# Patient Record
Sex: FEMALE | Race: WHITE | Employment: OTHER | URBAN - METROPOLITAN AREA
[De-identification: names, ages, dates, MRNs, and addresses within clinical notes are randomized per-mention and may not be internally consistent; named-entity substitution may affect disease eponyms.]

---

## 2017-04-27 ENCOUNTER — HOSPITAL ENCOUNTER (EMERGENCY)
Age: 72
Discharge: HOME | End: 2017-04-27
Attending: STUDENT IN AN ORGANIZED HEALTH CARE EDUCATION/TRAINING PROGRAM
Payer: MEDICARE

## 2017-04-27 ENCOUNTER — APPOINTMENT (OUTPATIENT)
Dept: GENERAL RADIOLOGY | Age: 72
End: 2017-04-27
Attending: EMERGENCY MEDICINE
Payer: MEDICARE

## 2017-04-27 VITALS
BODY MASS INDEX: 40.25 KG/M2 | SYSTOLIC BLOOD PRESSURE: 137 MMHG | HEIGHT: 60 IN | WEIGHT: 205 LBS | RESPIRATION RATE: 16 BRPM | TEMPERATURE: 98.4 F | HEART RATE: 68 BPM | OXYGEN SATURATION: 97 % | DIASTOLIC BLOOD PRESSURE: 68 MMHG
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
## 2017-04-27 DIAGNOSIS — M25.561 PAIN IN BOTH KNEES, UNSPECIFIED CHRONICITY: ICD-10-CM

## 2017-04-27 DIAGNOSIS — M25.562 PAIN IN BOTH KNEES, UNSPECIFIED CHRONICITY: ICD-10-CM

## 2017-04-27 DIAGNOSIS — S93.601A RIGHT FOOT SPRAIN, INITIAL ENCOUNTER: Primary | ICD-10-CM

## 2017-04-27 PROCEDURE — 97161 PT EVAL LOW COMPLEX 20 MIN: CPT

## 2017-04-27 PROCEDURE — 74011250637 HC RX REV CODE- 250/637: Performed by: STUDENT IN AN ORGANIZED HEALTH CARE EDUCATION/TRAINING PROGRAM

## 2017-04-27 PROCEDURE — 97116 GAIT TRAINING THERAPY: CPT

## 2017-04-27 PROCEDURE — 74011250636 HC RX REV CODE- 250/636: Performed by: STUDENT IN AN ORGANIZED HEALTH CARE EDUCATION/TRAINING PROGRAM

## 2017-04-27 PROCEDURE — 73620 X-RAY EXAM OF FOOT: CPT

## 2017-04-27 PROCEDURE — 97530 THERAPEUTIC ACTIVITIES: CPT

## 2017-04-27 PROCEDURE — G8979 MOBILITY GOAL STATUS: HCPCS

## 2017-04-27 PROCEDURE — 77030036687 HC SHOE PSTOP S2SG -A

## 2017-04-27 PROCEDURE — G8980 MOBILITY D/C STATUS: HCPCS

## 2017-04-27 PROCEDURE — G8978 MOBILITY CURRENT STATUS: HCPCS

## 2017-04-27 PROCEDURE — 73562 X-RAY EXAM OF KNEE 3: CPT

## 2017-04-27 PROCEDURE — 96372 THER/PROPH/DIAG INJ SC/IM: CPT

## 2017-04-27 PROCEDURE — 99283 EMERGENCY DEPT VISIT LOW MDM: CPT

## 2017-04-27 RX ORDER — HYDROMORPHONE HYDROCHLORIDE 1 MG/ML
1 INJECTION, SOLUTION INTRAMUSCULAR; INTRAVENOUS; SUBCUTANEOUS
Status: COMPLETED | OUTPATIENT
Start: 2017-04-27 | End: 2017-04-27

## 2017-04-27 RX ORDER — HYDROCODONE BITARTRATE AND ACETAMINOPHEN 5; 325 MG/1; MG/1
1 TABLET ORAL
Qty: 10 TAB | Refills: 0 | Status: SHIPPED | OUTPATIENT
Start: 2017-04-27

## 2017-04-27 RX ORDER — HYDROCODONE BITARTRATE AND ACETAMINOPHEN 5; 325 MG/1; MG/1
1 TABLET ORAL ONCE
Status: COMPLETED | OUTPATIENT
Start: 2017-04-27 | End: 2017-04-27

## 2017-04-27 RX ADMIN — HYDROCODONE BITARTRATE AND ACETAMINOPHEN 1 TABLET: 5; 325 TABLET ORAL at 15:02

## 2017-04-27 RX ADMIN — HYDROMORPHONE HYDROCHLORIDE 1 MG: 1 INJECTION, SOLUTION INTRAMUSCULAR; INTRAVENOUS; SUBCUTANEOUS at 16:14

## 2017-04-27 NOTE — SENIOR SERVICES NOTE
physical Therapy Emergency Department EVALUATION/DISCHARGE with CMS G codes  Patient: Shabana Foley (63 y.o. female)  Date: 4/27/2017  Primary Diagnosis: B knees, R foot pain s/p fall        Precautions:   Fall, WBAT  ASSESSMENT :  Based on the objective data described below, the patient presents with c/o B knee (L>R) pain and R foot pain resulting in significant decline in function. Patient's spouse is at chair side for support. Patient has been d/c but RN concerned for patient's mobility and consult SSED PT. Patient reporting fell yesterday at home; pt was wearing wedge sandal and believes she twisted her ankle while walking down the indoor ramp. Patient fell to her knees and needed her  to assist.  Patient seated in transport chair in [facility] waiting room. Upon evaluation, patient with edema and slight bluish tint to R lateral foot; pt tender to light palpation and demonstrate difficulty with active movement of toes. Patient report most tenderness from mid lateral foot distal to the toes. Patient anxious for assessment due to discomfort; pt reporting pain medication provided has not provided any relief. Taryn March RN made aware who will speak with physician. Patient wearing surgical shoe for support. Patient stand with CGA and cuing for encouragement. Patient demonstrate difficulty with gait training using crutches; PT recommend RW overground for stability and additional support. See below for details. Patient negotiate 3 stairs using 1HR and crutch with Marti; pt requiring consistent cuing for step sequencing and encouragement. Patient with R foot pain and L knee pain so stairs were difficult for patient to tolerate. Patient's spouse attempt to help with support on stairs but patient reports feeling more comfortable using crutch instead. Round with Dr. Marcos Gilbert regarding evaluation; recommend CT to r/o 5th met head fx. Consult with PRINCESS Bui for support wrap to wear with shoe.   This writer also educate patient on importance of RestIceCompressionElevation method during recovery; pt and spouse report understanding and are appreciative of assistance. Patient will further benefit from outpatient physical therapy post ortho f/u. PLAN :  Discharge Recommendations:   [x]   Home with family  []   Skilled nursing facility  []   Admission to hospital with rehab likely needed  []   Inpatient rehab referral  []   Outpatient physical therapy referral  []   Other:    Further Equipment Recommendations for Discharge:   [x]   Rolling walker with 5\" wheels - order placed and delivery in ED  []   Crutches   []   Zoila Pinks   []   Wheelchair   []   Other:     COMMUNICATION/EDUCATION:   Communication/Collaboration:  [x]   Fall prevention education was provided and the patient/caregiver indicated understanding. [x]   Patient/family have participated as able and agree with findings and recommendations. []   Patient is unable to participate in plan of care at this time. Findings and recommendations were discussed with: ED physician, SSED Case Management, PCT/EMTand Registered Nurse       SUBJECTIVE:   Patient stated I can't put any weight on the outside of my foot. That hydrocodone didn't even touch the pain.     OBJECTIVE DATA SUMMARY:     Past Medical History:   Diagnosis Date    Asthma     CAD (coronary artery disease)     MI     Cancer ([facility] [address]     Adenocarcinoma, right lobe    Cancer ([facility] [address]     Carcinoid, stage 1    Hypertension     Lung cancer ([facility] [address]      Past Surgical History:   Procedure Laterality Date    BREAST SURGERY PROCEDURE UNLISTED      implants    CHEST SURGERY PROCEDURE UNLISTED      1/04/2013    HX APPENDECTOMY      HX GYN      Hysterectomy, 1987    HX HEENT      HX LOBECTOMY         Prior Level of Function/Home Situation: baseline independent without devices; lives with spouse  Home Situation  Home Environment: Private residence  # Steps to Enter: 4  Rails to Enter: Yes  [employer] : Bilateral (only reach one at a time)  One/Two Story Residence: One story  Living Alone: No  Support Systems: Spouse/Significant Other/Partner, Family member(s), Friends \ neighbors  Patient Expects to be Discharged to[de-identified] Private residence  Current DME Used/Available at Home: None  Tub or Shower Type: Tub/Shower combination     Critical Behavior:  Neurologic State: Alert, Appropriate for age  Orientation Level: Oriented X4  Cognition: Appropriate decision making, Appropriate for age attention/concentration, Follows commands  Safety/Judgement: Awareness of environment, Fall prevention, Home safety       Skin:  Edema and slight bluish tint to R lateral foot; pt tender to light palpation and demonstrate difficulty with toe mobility    Strength:    Strength: Generally decreased, functional        Tone & Sensation:   Tone: Normal  Sensation: Intact        Range Of Motion:  AROM: Generally decreased, functional - Knee and ankle/foot due to discomfort; swelling noted to R lateral foot  PROM: Within functional limits           Coordination:  Coordination: Within functional limits    Functional Mobility:  Transfers:  Sit to Stand: Contact guard assistance  Stand to Sit: Contact guard assistance     Balance:   Sitting: Intact  Standing: Impaired; With support  Standing - Static: Fair  Standing - Dynamic : Fair      Ambulation/Gait Training:  Patient initially gait train with crutches; pt amb 5ft using crutches and gait belt, pt demonstrate difficulty with maintaining balance and advancement of LEs due to discomfort. Patient needs RW for increased stability and support.    Distance (ft): 55 Feet (ft)  Assistive Device: Walker, rolling;Gait belt  Ambulation - Level of Assistance: Supervision  Gait Abnormalities: Antalgic;Decreased step clearance  Right Side Weight Bearing: As tolerated  Base of Support: Widened  Stance: Right decreased  Speed/Joann: Slow  Step Length: Right shortened;Left shortened     Stair Training:  Number of Stairs: 3  Rail Use: Leftt (+ crutch on R)  Level of Assistance: Minimum Assistance + Additional time + Max cuing for encouragement and step sequencing   Patient demonstrate difficulty with stairs due to B knee pain (L >R)    Functional Measure:  Tinetti test:    Sitting Balance: 1  Arises: 1  Attempts to Rise: 2  Immediate Standing Balance: 1  Standing Balance: 1  Nudged: 1  Eyes Closed: 1  Turn 360 Degrees - Continuous/Discontinuous: 0  Turn 360 Degrees - Steady/Unsteady: 1  Sitting Down: 1  Balance Score: 10  Indication of Gait: 0  R Step Length/Height: 1  L Step Length/Height: 0  R Foot Clearance: 1  L Foot Clearance: 1  Step Symmetry: 0  Step Continuity: 0  Path: 1  Trunk: 0  Walking Time: 0  Gait Score: 4  Total Score: 14       Tinetti Test and G-code impairment scale:  Percentage of Impairment CH    0%   CI    1-19% CJ    20-39% CK    40-59% CL    60-79% CM    80-99% CN     100%   Tinetti  Score 0-28 28 23-27 17-22 12-16 6-11 1-5 0       Tinetti Tool Score Risk of Falls  <19 = High Fall Risk  19-24 = Moderate Fall Risk  25-28 = Low Fall Risk  Tinetti ME. Performance-Oriented Assessment of Mobility Problems in Elderly Patients. Stewart 66; [id number]. (Scoring Description: PT Bulletin Feb. 10, 1993)    Older adults: Rosmery Art et al, 2009; n = 1000 [facility] elderly evaluated with ABC, CAROLE, ADL, and IADL)  · Mean CAROLE score for males aged 69-68 years = 26.21(3.40)  · Mean CAROLE score for females age 69-68 years = 25.16(4.30)  · Mean CAROLE score for males over 80 years = 23.29(6.02)  · Mean CAROLE score for females over 80 years = 17.20(8.32)       In compliance with CMSs Claims Based Outcome Reporting, the following G-code set was chosen for this patient based on their primary functional limitation being treated: The outcome measure chosen to determine the severity of the functional limitation was the Tinetti with a score of 14/28 which was correlated with the impairment scale.     ? Mobility - Walking and Moving Around:  - CURRENT STATUS: CK - 40%-59% impaired, limited or restricted    - GOAL STATUS: CK - 40%-59% impaired, limited or restricted    - D/C STATUS:  CK - 40%-59% impaired, limited or restricted     Pain:  Pain Scale 1: Numeric (0 - 10)  Pain Intensity 1: 7  Pain Location 1: Foot;Knee  Pain Orientation 1: Right        Activity Tolerance:   Good    Please refer to the flowsheet for vital signs taken during this treatment.     After treatment:   [x]         Patient left in no apparent distress sitting up in chair  []         Patient left in no apparent distress in bed  [x]         Call bell left within reach  [x]         Physician notified  [x]         Caregiver present  []         Bed alarm activated        Thank you for this referral.  Mia Longoria, PT, DPT   Time Calculation: 50 mins

## 2017-04-27 NOTE — DISCHARGE INSTRUCTIONS
Foot Sprain: Care Instructions  Your Care Instructions    A foot sprain occurs when you stretch or tear the ligaments around your foot. Ligaments are the tough tissues that connect one bone to another. A sprain can happen when you run, fall, or hit your toe against something. Sprains often happen when you jump or change direction quickly. This may occur when you play basketball, soccer, or other sports. Most foot sprains will get better with treatment at home. Follow-up care is a key part of your treatment and safety. Be sure to make and go to all appointments, and call your doctor if you are having problems. It's also a good idea to know your test results and keep a list of the medicines you take. How can you care for yourself at home? · Walk or put weight on your sprained foot as long as it does not hurt. · If your doctor gave you a splint or immobilizer, wear it as directed. If you were given crutches, use them as directed. · For the first 2 days after your injury, avoid hot showers, hot tubs, or hot packs. They may increase swelling. · Put ice or a cold pack on your foot for 10 to 20 minutes at a time to stop swelling. Try this every 1 to 2 hours for 3 days (when you are awake) or until the swelling goes down. Put a thin cloth between the ice pack and your skin. Keep your splint dry. · After 2 or 3 days, if your swelling is gone, put a heating pad (set on low) or a warm cloth on your foot. Some doctors suggest that you go back and forth between hot and cold treatments. · Prop up your foot on a pillow when you ice it or anytime you sit or lie down. Try to keep it above the level of your heart. This will help reduce swelling. · Take pain medicines exactly as directed. ¨ If the doctor gave you a prescription medicine for pain, take it as prescribed. ¨ If you are not taking a prescription pain medicine, ask your doctor if you can take an over-the-counter medicine.   · Do any exercises that your doctor or physical therapist suggests. · Return to your usual exercise gradually as you feel better. When should you call for help? Call your doctor now or seek immediate medical care if:  · You have increased or severe pain. · Your toes are cool or pale or change color. · Your wrap or splint feels too tight. · You have signs of a blood clot, such as:  ¨ Pain in your calf, back of the knee, thigh, or groin. ¨ Redness and swelling in your leg or groin. · You have tingling, weakness, or numbness in your leg or foot. Watch closely for changes in your health, and be sure to contact your doctor if:  · You cannot put any weight on your foot. · You get a fever. · You do not get better as expected. Where can you learn more? Go to http://diana-merry.info/. Enter R166 in the search box to learn more about \"Foot Sprain: Care Instructions. \"  Current as of: June 21, 2016  Content Version: 11.2  © 7443-5716 [employer], [employer]. Care instructions adapted under license by [employer] (which disclaims liability or warranty for this information). If you have questions about a medical condition or this instruction, always ask your healthcare professional. [address] any warranty or liability for your use of this information.

## 2017-04-27 NOTE — ED NOTES
Discharge instructions given to patient by MD and nurse. Pt has been given counseling on medication use and verbalizes understanding. Pt awaiting xray CDs before discharge and inquired about crutches walking at home. MD notified and orders placed but because of pt age, unsure of able to utilize crutches safely, will place consult for senior services for home ambulation    4:18 PM  Jessica at bedside ambulating pt with crutches and are trying to order walker for home. More pain medication given and MD notified by PT to inquire about CT of foot which he is not ordering at this time. Pt got CDs of xray in hand and awaiting walker before discharged home. 4:48 PM  Pt and family have crutches and walker, discharge paperwork in hand and xray CDS. Pt wheelchaired off of unit in no signs of distress and denies any questions.

## 2017-04-27 NOTE — ED PROVIDER NOTES
HPI Comments: 67 y.o. female with past medical history significant for osteoarthritis and past left arthroscopic surgery (1995) who presents from home with chief complaint of left knee pain and right foot pain after mechanical GLF. Pt reports that last night she tripped off a single step inverting her right ankle and falling forward on to her knees. Since the incident she has had constant aching right mid-foot pain with swelling and left knee pain. She reports that her pain is worse with movement and ambulation. She has tried Tyelnol arthritis with minimal relief. Pt denies LOC, head injury, dizziness, any other injury. There are no other acute medical concerns at this time. PCP: Annetta Finch MD  Note written by rafi Chairez, as dictated by Enrique Tristan MD 2:56 PM           The history is provided by the patient. Past Medical History:   Diagnosis Date    Asthma     CAD (coronary artery disease)     MI     [facility] ([facility] [address]     Adenocarcinoma, right lobe    Cancer ([facility] [address]     Carcinoid, stage 1    Hypertension     Lung cancer ([facility] [address]        Past Surgical History:   Procedure Laterality Date    BREAST SURGERY PROCEDURE UNLISTED      implants    CHEST SURGERY PROCEDURE UNLISTED      1/04/2013    HX APPENDECTOMY      HX GYN      Hysterectomy, 1987    HX HEENT      HX LOBECTOMY           History reviewed. No pertinent family history. Social History     Social History    Marital status:      Spouse name: N/A    Number of children: N/A    Years of education: N/A     Occupational History    Not on file. Social History Main Topics    Smoking status: Former Smoker    Smokeless tobacco: Not on file    Alcohol use Yes    Drug use: No    Sexual activity: Not on file     Other Topics Concern    Not on file     Social History Narrative         ALLERGIES: Meloxicam; Lisinopril; Plavix [clopidogrel];  Cymbalta [duloxetine]; and Erythromycin    Review of Systems Constitutional: Negative for chills and fever. HENT: Negative for congestion, rhinorrhea and sore throat. Respiratory: Negative for cough and shortness of breath. Gastrointestinal: Negative for abdominal pain, diarrhea, nausea and vomiting. Musculoskeletal: Positive for arthralgias, joint swelling and myalgias. Negative for back pain and neck pain. All other systems reviewed and are negative. Vitals:    04/27/17 1404   BP: 148/73   Pulse: 74   Resp: 16   Temp: 98.4 °F (36.9 °C)   SpO2: 96%   Weight: 93 kg (205 lb)   Height: 5' (1.524 m)            Physical Exam   Constitutional: She is oriented to person, place, and time. She appears well-developed and well-nourished. Obese female   HENT:   Head: Normocephalic and atraumatic. Eyes: Conjunctivae and EOM are normal. Pupils are equal, round, and reactive to light. Neck: Normal range of motion. Neck supple. Cardiovascular: Normal rate, regular rhythm and normal heart sounds. No murmur heard. Pulmonary/Chest: Effort normal and breath sounds normal. No respiratory distress. Abdominal: Soft. Bowel sounds are normal. She exhibits no distension. There is no tenderness. There is no rebound. Musculoskeletal: Normal range of motion. She exhibits tenderness. She exhibits no edema. Mild tenderness with minimal soft tissue swelling and ecchymosis over dorsal right mid-foot. Diffuse non-focal tenderness to left knee. Neurological: She is alert and oriented to person, place, and time. No cranial nerve deficit. She exhibits normal muscle tone. Coordination normal.   Skin: Skin is warm and dry. No rash noted. Psychiatric: She has a normal mood and affect. Her behavior is normal.   Nursing note and vitals reviewed.    Note written by rafi Barron, as dictated by Charles Parker MD 3:07 PM      [facility]  ED Course       Procedures

## 2017-04-27 NOTE — PROGRESS NOTES
SSED/CM consult received and appreciated. EMR reviewed. History significant for respiratory failure and osteoarthritis. Patient presents to the ED s/p fall. Patient will need RW. Met w/patient and spouse introduced to role of CM. This writer acknowledged patient in fast track area offered private location to talk - Ms. Martinez verbalized would remain in area for assessment. Patient lives home w/ spouse and is independent w/ ADLs.  works as a realtor w/ variable hours. DME list available and informed of DME closet at [facility]. Patient prefer [facility] if approved will have RW when leaving hospital. [facility] letter completed and agency paperwork. Referral sent via [employer]. Contact made w/agency - authorization obtained to provide RW from DME closet. Updates provided to patient and Nursing. No additional needs verbalized at this time. Transportation home provided by spouse. Care Management Interventions  Mode of Transport at Discharge:  Other (see comment) (car)  Hospital Transport Time of Discharge: [address] (CM Consult): Discharge Planning  MyChart Signup: No  Discharge Durable Medical Equipment: No  Physical Therapy Consult: Yes  Occupational Therapy Consult: No  Speech Therapy Consult: No  Current Support Network: Lives with Spouse  Confirm Follow Up Transport: Family  Plan discussed with Pt/Family/Caregiver: Yes  Freedom of Choice Offered: Yes  Discharge Location  Discharge Placement: Home

## 2023-12-11 ENCOUNTER — HOSPITAL ENCOUNTER (OUTPATIENT)
Facility: HOSPITAL | Age: 78
Discharge: HOME | End: 2023-12-14
Payer: MEDICARE

## 2023-12-11 DIAGNOSIS — R42 DIZZINESS: ICD-10-CM

## 2023-12-11 DIAGNOSIS — G43.109 OCULAR MIGRAINE: ICD-10-CM

## 2023-12-11 DIAGNOSIS — R42 LIGHTHEADEDNESS: ICD-10-CM

## 2023-12-11 LAB — CREAT BLD-MCNC: 0.9 MG/DL (ref 0.6–1.3)

## 2023-12-11 PROCEDURE — 6360000004 HC RX CONTRAST MEDICATION: Performed by: FAMILY MEDICINE

## 2023-12-11 PROCEDURE — 70470 CT HEAD/BRAIN W/O & W/DYE: CPT

## 2023-12-11 PROCEDURE — 82565 ASSAY OF CREATININE: CPT

## 2023-12-11 RX ADMIN — IOPAMIDOL 100 ML: 755 INJECTION, SOLUTION INTRAVENOUS at 10:58
